# Patient Record
Sex: MALE | Race: WHITE | NOT HISPANIC OR LATINO | Employment: FULL TIME | URBAN - METROPOLITAN AREA
[De-identification: names, ages, dates, MRNs, and addresses within clinical notes are randomized per-mention and may not be internally consistent; named-entity substitution may affect disease eponyms.]

---

## 2023-07-12 ENCOUNTER — HOSPITAL ENCOUNTER (OUTPATIENT)
Dept: DATA CONVERSION | Facility: HOSPITAL | Age: 33
End: 2023-07-12
Attending: ORTHOPAEDIC SURGERY | Admitting: ORTHOPAEDIC SURGERY

## 2023-07-12 DIAGNOSIS — S83.242A OTHER TEAR OF MEDIAL MENISCUS, CURRENT INJURY, LEFT KNEE, INITIAL ENCOUNTER: ICD-10-CM

## 2023-07-12 DIAGNOSIS — S83.232A COMPLEX TEAR OF MEDIAL MENISCUS, CURRENT INJURY, LEFT KNEE, INITIAL ENCOUNTER: ICD-10-CM

## 2023-07-12 DIAGNOSIS — S83.212A BUCKET-HANDLE TEAR OF MEDIAL MENISCUS, CURRENT INJURY, LEFT KNEE, INITIAL ENCOUNTER: ICD-10-CM

## 2023-09-29 VITALS — BODY MASS INDEX: 24.75 KG/M2 | WEIGHT: 199.08 LBS | HEIGHT: 75 IN

## 2023-10-02 NOTE — OP NOTE
Post Operative Note:     PreOp Diagnosis: Left knee medial meniscus tear   Post-Procedure Diagnosis: Left knee medial meniscus  tear   Procedure: Left arthroscopic knee surgery and partial  medial meniscectomy   Surgeon: ERIC Baltazar MD   Resident/Fellow/Other Assistant: ANDERSON Gaytan SA   Anesthesia: General   I.V. Fluids: Crystalloid   Estimated Blood Loss (mL): Minimal   Specimen: yes. 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  He had a valgus load stress injury to the left  knee and felt a pop in the knee.  Clinical exam and MRI were consistent with a medial meniscus tear.  Risks and benefits of nonoperative and operative treatment reviewed.  Patient wanted to proceed with an operative approach.  Left arthroscopic knee surgery  partial  meniscectomy and possible meniscus repair were reviewed.  They were informed of the incisions and period of recovery.  Patient was informed of risks to include but not limited to persistent pain, recurrent tear, infection, wound complications,  arthrofibrosis, DVT, pulmonary embolus, neurovascular injury, complex regional pain syndrome, medical and anesthetic risk.  In addition they were informed of the potential risk of exposure to COVID-19 and the potential medical complications.  All questions  were answered and consent was obtained.    Patient was brought to the operating room where after induction of general anesthesia while supine on the operating table exam under anesthesia was undertaken.  Patient had no evidence of effusion.  There was normal tracking the patella.  Range of motion  was 0-135 degrees.  Patient had negative anterior drawer, negative Lachmans.  Patient had no evidence of  medial, lateral, posterior or posterior lateral instability.  Left lower extremity was prepped and draped in usual sterile fashion.  Patient was given  2 g of cefazolin for prophylactic antibiotics.  Timeout was taken confirming surgical site, procedure, instrumentation, fire risk, and antibiotics.  Sterile Esmarch was used to exsanguinate the left lower extremity.  Tourniquet was inflated to 300 mmHg.   60 cc of sterile saline introduced into the knee joint.15 blade was used to make standard anterior medial and anterior lateral portals.  The arthroscope was inserted through the anterolateral portal into the suprapatellar pouch.  Under direct vision  a medial egress needle was placed for outflow.  Arthroscopic evaluation demonstrated no evidence of loose bodies or other pathology within the pouch of the gutters.  The patella was normally centered.  There is mild degenerative changes in the patellofemoral  joint.  The notch demonstrated intact anterior cruciate ligament and posterior cruciate ligament.  There mild changes within the posterior medial portion of the femoral condyle where the medial meniscus displaced fragment was incarcerated.  The medial  compartment demonstrated evidence of a bucket-handle medial meniscus tear.   There was a displaced bucket-handle tear that was displaced into the notch.  This was reduced.  This involved approximately 3 to 70% of the posterior horn and mid body of the   meniscus.  Using arthroscopic biter and shaver partial medial meniscectomy was completed.  A stable meniscal rim was obtained.  The lateral compartment demonstrated no evidence of tear of the lateral meniscus.  There is no evidence degenerative changes  within the lateral compartment.    Final arthroscopic images were taken.  The knee was copiously irrigated through inflow outflow cannulas.  Arthroscopic instruments were removed.  Portals closed with 4-0 Monocryl subcuticular suture.  Bacitracin Adaptic  sterile  dressings were applied.  All sponge and the counts were correct at the end the case.        Electronic Signatures:  Luis Baltazar (MD)  (Signed 12-Jul-2023 09:44)   Authored: Post Operative Note, Note Completion      Last Updated: 12-Jul-2023 09:44 by Luis Baltazar)